# Patient Record
(demographics unavailable — no encounter records)

---

## 2025-05-22 NOTE — ASSESSMENT
[FreeTextEntry1] : 64 year old M with nocturia and daytime OAB. Symptoms improved with oxybutynin 10 mg but ran out of Rx and has not followed up in >1 year. Now with elevated PVR. For BPH with elevated PVR: The anatomy of the lower genitourinary tract was explained to the patient, with the urine passing through the bladder neck and prostatic urethra as it exits the body. Prostatic enlargement can constrict the lumen and the bladder outlet, causing incomplete bladder emptying and irritative symptoms such and frequency and urgency. Alpha blockers can be used to relax the bladder neck and facilitate passage of urine from the bladder. 5-alpha reductase inhibitors can be employed to shrink the prostate and thereby allow for passage of urine more easily. Anticholinergics to decrease bladder irritative symptoms were also discussed, but it was stressed that they can increase post void residual and risk urinary retention. Will Rx Flomax 0.4 mg QHS. PSA Screening: Discussed PSA screening and latest recommendations/guidelines- USPTF and AUA.  Will obtain PSA and inform results. LINA benign. For OAB/ Nocturia/ urinary frequency: Will obtain UA/UCx. Will renew Rx for Oxybutynin 10 mg ER- patient will try Flomax first if no symptom improvement will start Oxybutynin in addition to Flomax. RTO in 6 weeks to 3 months for symptom and PVR check or sooner PRN.

## 2025-05-22 NOTE — ASSESSMENT
[FreeTextEntry1] : 64 year old M with nocturia and daytime OAB. Symptoms improved with oxybutynin 10 mg but ran out of Rx and has not followed up in >1 year. Now with elevated PVR. For BPH with elevated PVR: The anatomy of the lower genitourinary tract was explained to the patient, with the urine passing through the bladder neck and prostatic urethra as it exits the body. Prostatic enlargement can constrict the lumen and the bladder outlet, causing incomplete bladder emptying and irritative symptoms such and frequency and urgency. Alpha blockers can be used to relax the bladder neck and facilitate passage of urine from the bladder. 5-alpha reductase inhibitors can be employed to shrink the prostate and thereby allow for passage of urine more easily. Anticholinergics to decrease bladder irritative symptoms were also discussed, but it was stressed that they can increase post void residual and risk urinary retention. Will Rx Flomax 0.4 mg QHS. PSA Screening: Discussed PSA screening and latest recommendations/guidelines- USPTF and AUA.  Will obtain PSA and inform results. LNIA benign. For OAB/ Nocturia/ urinary frequency: Will obtain UA/UCx. Will renew Rx for Oxybutynin 10 mg ER- patient will try Flomax first if no symptom improvement will start Oxybutynin in addition to Flomax. RTO in 6 weeks to 3 months for symptom and PVR check or sooner PRN.

## 2025-05-22 NOTE — HISTORY OF PRESENT ILLNESS
[FreeTextEntry1] : 62 year old man seen 05/18/2023 with complaint of frequency, urgency, and nocturia 4x/night. For BPH, he is on tamsulosin, which has not improved his symptoms.  No hematuria, no dysuria, no hesitancy, no straining. No incontinence.  No fevers, no chills, no nausea, no vomiting, no flank pain.   11/16/2023: Patient presents for follow up. He reports nocturia down to 2-3x/night and no daytime symptoms. Feels oxybutynin is helping. No dry mouth, no constipation. Feel he is emptying his bladder well. PVR 0 mL  05/22/2025: Patient presents for follow up. He reports he has history of OAB and was doing well on Oxybutynin 10 mg daily, until he ran out few months ago and notes he has urinary frequency, urgency every hour and nocturia x 4. Also has not had prostate cancer screening in the past two years. No hematuria, no dysuria, no hesitancy, no straining. No incontinence. No fevers, no chills, no nausea, no vomiting, no flank pain. PVR: 251 mL.

## 2025-05-22 NOTE — PHYSICAL EXAM
[General Appearance - Well Developed] : well developed [General Appearance - Well Nourished] : well nourished [Normal Appearance] : normal appearance [Well Groomed] : well groomed [General Appearance - In No Acute Distress] : no acute distress [Abdomen Soft] : soft [Abdomen Tenderness] : non-tender [Costovertebral Angle Tenderness] : no ~M costovertebral angle tenderness [Urinary Bladder Findings] : the bladder was normal on palpation [Edema] : no peripheral edema [] : no respiratory distress [Respiration, Rhythm And Depth] : normal respiratory rhythm and effort [Exaggerated Use Of Accessory Muscles For Inspiration] : no accessory muscle use [Oriented To Time, Place, And Person] : oriented to person, place, and time [Affect] : the affect was normal [Mood] : the mood was normal [Not Anxious] : not anxious [Normal Station and Gait] : the gait and station were normal for the patient's age [No Focal Deficits] : no focal deficits [No Palpable Adenopathy] : no palpable adenopathy [Prostate Tenderness] : the prostate was not tender [No Prostate Nodules] : no prostate nodules [Chaperone Present] : A chaperone was present in the examining room during all aspects of the physical examination [Urethral Meatus] : meatus normal [Prostate Size ___ gm] : prostate size [unfilled] gm [FreeTextEntry1] : Left inguinal hernia appreciated, soft and nontender.  [FreeTextEntry2] : STAR Serrano